# Patient Record
(demographics unavailable — no encounter records)

---

## 2024-11-15 NOTE — ASSESSMENT
[FreeTextEntry1] :  51 year old male with LUTS and prostatitis. Had first episode prostatitis in 2021, treated with cipro. Started flomax at that time to prevent recurrences. Had first recurrence 11/2024. Being treated with bactrim x 1 month. Overall feeling better since starting bactrim.   His PSA was elevated during prostatitis episode, but follow ups remained elevated up to 6.2. He had a negative prostate MRI in 6/2021. His PSA in 4/2022 was down to 2.97 and 4.41 in 3/2023. Discussed need for continued PSA screening. Given recent infection, will plan to recheck PSA in 1-2 months.   He had microhematuria in 3/2023. CT urogram and cystoscopy were recommended. CT urogram was performed and unremarkable. Cystoscopy was not performed. Will repeat UA today and if persistent microhematuria, will proceed with cystoscopy.  - F/U UA, UCx  - Continue flomax  - Complete Bactrim

## 2024-11-15 NOTE — HISTORY OF PRESENT ILLNESS
[FreeTextEntry1] : 50 year old man with no significant past medical history who presents with 3 days of dysuria, frequency, urgency, dark urine and chills. He reports symptoms started suddenly 3 days ago, but have been stable since. He presented to urgent care, where he was told he may have a UTI and was prescribed cipro. He has not started that medication yet. He is sexually active with , however, has been long time since last sexual encounter. He denies urethral discharge, scrotal pain or penile lesions. Prior to this episode, he denies significant urinary symptoms other than mild frequency.   IPSS Questionnaire (AUA-7):  Over the past monthAA? 1) How often have you had a sensation of not emptying your bladder completely after you finish urinating? 0 2) How often have you had to urinate again less than two hours after you finished urinating? 3 3) How often have you found you stopped and started again several times when you urinated? 0 4) How difficult have you found it to postpone urination? 5 5) How often have you had a weak urinary stream? 4 6) How often have you had to push or strain to begin urination? 0 7) How many times did you most typically get up to urinate from the time you went to bed until the time you got up in the morning? 3  Total score: 15 0-7 mildly symptomatic 8-19 moderately symptomatic 20-35 severely symptomatic  Quality of life score (out of 6): 6  PVR: 15 cc  4/28/21: Patient rolfsents for follow up. He reports feeling significantly better after cipro and flomax. He has continued flomax and feels that his baseline urinary symptoms have improved significantly. No recent fevers, chills, dysuria, hematuria, flank pain.  PSA: 3/13/21--42.9; 4/28/21--6.09; 6/29/21--6.2; 4/1/22--2.97  MRI prostate 7/23/21--40 cc gland, no concerning lesions  4/26/22: Doing well. No new urinary symptoms or recurrent prostatitis. Happy with flomax. PSA down to 2.97.  IPSS 0, QOL 1, PVR 5 cc  3/7/23: Doing well. No urinary symptoms or bother. No recurrent prostatitis. Happy with flomax. Unsure of recent PSA.  IPSS 6, QOL 1 UA with large blood  11/15/24: Patient here with concerns of prostatitis. He was seen by PCP with urinary symptoms and was given 1 month of Bactrim. Feels better after starting the antibiotics. Still taking the tamsulosin. Believes urine culture was positive. Now feeling better, back to baseline symptoms.  No recent PSA.  PVR: 0 ml

## 2024-11-15 NOTE — PHYSICAL EXAM
Okay to do patient follow-up appointment with me.   [Normal Appearance] : normal appearance [Well Groomed] : well groomed [General Appearance - In No Acute Distress] : no acute distress [Edema] : no peripheral edema [Respiration, Rhythm And Depth] : normal respiratory rhythm and effort [Exaggerated Use Of Accessory Muscles For Inspiration] : no accessory muscle use [Abdomen Tenderness] : non-tender [Abdomen Soft] : soft [Costovertebral Angle Tenderness] : no ~M costovertebral angle tenderness [Normal Station and Gait] : the gait and station were normal for the patient's age [] : no rash [No Focal Deficits] : no focal deficits [Oriented To Time, Place, And Person] : oriented to person, place, and time [Affect] : the affect was normal [Mood] : the mood was normal

## 2024-11-15 NOTE — LETTER BODY
[Dear  ___] : Dear  [unfilled], [Courtesy Letter:] : I had the pleasure of seeing your patient, [unfilled], in my office today. [Please see my note below.] : Please see my note below. [Consult Closing:] : Thank you very much for allowing me to participate in the care of this patient.  If you have any questions, please do not hesitate to contact me. [Sincerely,] : Sincerely, [FreeTextEntry3] : Dawna Germain MD